# Patient Record
Sex: FEMALE | Race: BLACK OR AFRICAN AMERICAN | NOT HISPANIC OR LATINO | Employment: PART TIME | URBAN - METROPOLITAN AREA
[De-identification: names, ages, dates, MRNs, and addresses within clinical notes are randomized per-mention and may not be internally consistent; named-entity substitution may affect disease eponyms.]

---

## 2017-10-01 ENCOUNTER — PATIENT MESSAGE (OUTPATIENT)
Dept: PSYCHIATRY | Facility: CLINIC | Age: 32
End: 2017-10-01

## 2017-10-10 ENCOUNTER — OFFICE VISIT (OUTPATIENT)
Dept: PSYCHIATRY | Facility: CLINIC | Age: 32
End: 2017-10-10
Payer: MEDICAID

## 2017-10-10 DIAGNOSIS — F39 MOOD DISORDER: Primary | ICD-10-CM

## 2017-10-10 DIAGNOSIS — F60.3 BORDERLINE PERSONALITY DISORDER: ICD-10-CM

## 2017-10-10 PROCEDURE — 90834 PSYTX W PT 45 MINUTES: CPT | Mod: AJ,HB,S$PBB, | Performed by: SOCIAL WORKER

## 2017-10-10 PROCEDURE — 90834 PSYTX W PT 45 MINUTES: CPT | Mod: PBBFAC | Performed by: SOCIAL WORKER

## 2017-10-10 NOTE — PROGRESS NOTES
"Individual Psychotherapy (PhD/LCSW)    10/10/2017    Site:  [facility]         Therapeutic Intervention: Met with patient.  Outpatient - Insight oriented psychotherapy 45 min - CPT code 91357    Chief complaint/reason for encounter: mood swings, anger and personality relationship problems     Interval history and content of current session: pt 15 minutes late.   She is known to me from [id number].  She is still with boyfriend.   He is working.      That since she was treated by us she has tried to get into [facility] but she never did.  That she did find a primary care at that clinic.   She was given xanax 0.25mg which she takes as needed.      [employer] classes in [place].   recommended she comes back to therapy.  That she was told she has an identity problem.     Problem she wants to work for is her identity.  That she feels like a child in the sense of wanting to be liked by others.      She is going to [employer] twice a week.  Sunday service and a class called getting to the root of your identity.  No drugs and 1 drink at times.     Anger is still a problem.   Daughter is 11.  She is staying with pt mother because of the arguments of her with Manny.  Monday Friday is routine. But when off on Sat. No structure she cleans for hours.  Her mind is constantly going and she needs to do something. If she sits still she cant.  That she is indecisive.  For instance if she buys something by the time she gets home she may regret the decision and she goes to the store up to 4 times to return it  (say for a lipstick).       she wants to be in control.     Elementary school shy.  Grades were good in English reading science.  Math was good until later grades.   High school not good years.  Stress with house affecting school.   Mother and father fighting a  Lot.      Energy level is "I feel like I have a little bit of energy right now" meaning a bit above normal.  "I live in fear".  "Job, sexual " "trauma, trauma of parents fighting". If I feel fear I won't do it".  [place] family two hours away.  If she feels fear she will not go.   Fear getting  having another child.    He wants marriage and child.      When angry she needs to control, either in your face or isolates.  Very similar to her mother.       Pt can cry easily.  She can internalize it.  What did I do wrong.  No suicidal thoughts.  At 16 overdose but a friend made her vomit immediately after.  That suicide is not an option because of her daughter.    Sleep jumpy, feels she hears noises in house. She always sleeps at least two hours.  Nightmares about "re enacting things" of when she was younger.  Fear someone is trying to get in the house. Sometimes fear they are in the house and turns on all the lights when has to go to a room.       Food for comfort has gained weight from 167 in 2015 to current 209.  No purge.  No full bag of oreo.  Half of it.  Not go to gym. She does not feel "depressed".      Medication option.  Pt wants this option if it does not make her gain weight and if it helps her calm down or focus.   She will consider making an appointment with psychiatrist if she chooses so.   Pt is logical, focused, good vocabulary and communication skills.  Not pressured but does talks quick.  Not tangential or circumstantial.  Well dressed for work.          Treatment plan:  · Target symptoms: anxiety , mood swings, mood disorder  · Why chosen therapy is appropriate versus another modality: relevant to diagnosis  · Outcome monitoring methods: self-report, observation  · Therapeutic intervention type: insight oriented psychotherapy    Risk parameters:  Patient reports no suicidal ideation  Patient reports no homicidal ideation  Patient reports no self-injurious behavior  Patient reports no violent behavior    Verbal deficits: None    Patient's response to intervention:  The patient's response to intervention is accepting.    Progress toward " goals and other mental status changes:  The patient's progress toward goals is limited.    Diagnosis:     ICD-10-CM ICD-9-CM   1. Mood disorder F39 296.90   2. Borderline personality disorder F60.3 301.83       Plan:  individual psychotherapy and consult psychiatrist for medication evaluation  Long term psychotherapy with frequent appointments (weekly ideal).   Return to clinic: 1 week    Length of Service (minutes): 45

## 2017-10-11 ENCOUNTER — TELEPHONE (OUTPATIENT)
Dept: PSYCHIATRY | Facility: CLINIC | Age: 32
End: 2017-10-11

## 2017-11-11 ENCOUNTER — PATIENT MESSAGE (OUTPATIENT)
Dept: OBSTETRICS AND GYNECOLOGY | Facility: CLINIC | Age: 32
End: 2017-11-11

## 2017-11-13 NOTE — TELEPHONE ENCOUNTER
If upt was - then its wasn't a miscarriage.  prob just a cycle since nexplanon getting to end of it lifespan  Monitor and call for problems, if needs an appt you can schedule her

## 2017-11-13 NOTE — TELEPHONE ENCOUNTER
Hi,   Yesterday may have experienced a miscarriage. Severe pelvic pain and cramping followed by severe bleeding. Passed large clit of blood and still experiencing bleeding. Have had the flori almost 3 years with no menstraul cycle the entire time. The past few months have noticed pelvic pain. Attached is an image of what it looked like

## 2018-01-04 ENCOUNTER — PATIENT MESSAGE (OUTPATIENT)
Dept: PSYCHIATRY | Facility: CLINIC | Age: 33
End: 2018-01-04

## 2018-01-16 ENCOUNTER — PATIENT MESSAGE (OUTPATIENT)
Dept: PSYCHIATRY | Facility: CLINIC | Age: 33
End: 2018-01-16

## 2018-02-11 ENCOUNTER — OFFICE VISIT (OUTPATIENT)
Dept: URGENT CARE | Facility: CLINIC | Age: 33
End: 2018-02-11
Payer: MEDICAID

## 2018-02-11 VITALS
SYSTOLIC BLOOD PRESSURE: 126 MMHG | WEIGHT: 250 LBS | RESPIRATION RATE: 18 BRPM | TEMPERATURE: 99 F | BODY MASS INDEX: 37.89 KG/M2 | OXYGEN SATURATION: 97 % | HEIGHT: 68 IN | HEART RATE: 82 BPM | DIASTOLIC BLOOD PRESSURE: 84 MMHG

## 2018-02-11 DIAGNOSIS — R50.9 FEVER, UNSPECIFIED FEVER CAUSE: ICD-10-CM

## 2018-02-11 DIAGNOSIS — J11.1 INFLUENZA: Primary | ICD-10-CM

## 2018-02-11 LAB
CTP QC/QA: YES
FLUAV AG NPH QL: NEGATIVE
FLUBV AG NPH QL: POSITIVE

## 2018-02-11 PROCEDURE — 3008F BODY MASS INDEX DOCD: CPT | Mod: S$GLB,,, | Performed by: NURSE PRACTITIONER

## 2018-02-11 PROCEDURE — 99213 OFFICE O/P EST LOW 20 MIN: CPT | Mod: S$GLB,,, | Performed by: NURSE PRACTITIONER

## 2018-02-11 PROCEDURE — 87804 INFLUENZA ASSAY W/OPTIC: CPT | Mod: 59,QW,S$GLB, | Performed by: NURSE PRACTITIONER

## 2018-02-11 RX ORDER — PROMETHAZINE HYDROCHLORIDE AND DEXTROMETHORPHAN HYDROBROMIDE 6.25; 15 MG/5ML; MG/5ML
5 SYRUP ORAL 4 TIMES DAILY PRN
Qty: 100 ML | Refills: 0 | Status: SHIPPED | OUTPATIENT
Start: 2018-02-11 | End: 2018-02-16

## 2018-02-11 NOTE — PATIENT INSTRUCTIONS
Take over the counter Ibuprofen or Tylenol for fever/pain relief.    Please arrange follow up with your primary medical clinic as soon as possible. You must understand that you've received an Urgent Care treatment only and that you may be released before all of your medical problems are known or treated. You, the patient, will arrange for follow up as instructed. If your symptoms worsen or fail to improve you should go to the Emergency Room.      Influenza (Adult)    Influenza is also called the flu. It is a viral illness that affects the air passages of your lungs. It is different from the common cold. The flu can easily be passed from one to person to another. It may be spread through the air by coughing and sneezing. Or it can be spread by touching the sick person and then touching your own eyes, nose, or mouth.  The flu starts 1 to 3 days after you are exposed to the flu virus. It may last for 1 to 2 weeks but many people feel tired or fatigued for many weeks afterward. You usually dont need to take antibiotics unless you have a complication. This might be an ear or sinus infection or pneumonia.  Symptoms of the flu may be mild or severe. They can include extreme tiredness (wanting to stay in bed all day), chills, fevers, muscle aches, soreness with eye movement, headache, and a dry, hacking cough.  Home care  Follow these guidelines when caring for yourself at home:  · Avoid being around cigarette smoke, whether yours or other peoples.  · Acetaminophen or ibuprofen will help ease your fever, muscle aches, and headache. Dont give aspirin to anyone younger than 18 who has the flu. Aspirin can harm the liver.  · Nausea and loss of appetite are common with the flu. Eat light meals. Drink 6 to 8 glasses of liquids every day. Good choices are water, sport drinks, soft drinks without caffeine, juices, tea, and soup. Extra fluids will also help loosen secretions in your nose and lungs.  · Over-the-counter cold  medicines will not make the flu go away faster. But the medicines may help with coughing, sore throat, and congestion in your nose and sinuses. Dont use a decongestant if you have high blood pressure.  · Stay home until your fever has been gone for at least 24 hours without using medicine to reduce fever.  Follow-up care  Follow up with your healthcare provider, or as advised, if you are not getting better over the next week.  If you are age 65 or older, talk with your provider about getting a pneumococcal vaccine every 5 years. You should also get this vaccine if you have chronic asthma or COPD. All adults should get a flu vaccine every fall. Ask your provider about this.  When to seek medical advice  Call your healthcare provider right away if any of these occur:  · Cough with lots of colored mucus (sputum) or blood in your mucus  · Chest pain, shortness of breath, wheezing, or trouble breathing  · Severe headache, or face, neck, or ear pain  · New rash with fever  · Fever of 100.4°F (38°C) or higher, or as directed by your healthcare provider  · Confusion, behavior change, or seizure  · Severe weakness or dizziness  · You get a new fever or cough after getting better for a few days  Date Last Reviewed: 1/1/2017  © 1398-2855 [employer]. [address] [place] [address], [place] [address]. All rights reserved. This information is not intended as a substitute for professional medical care. Always follow your healthcare professional's instructions.

## 2018-02-11 NOTE — LETTER
February 11, 2018      [facility]  [address]  [facility] [address]  Phone: [phone]  Fax: [phone]       Patient: Denisse Pennington   YOB: 1985  Date of Visit: 02/11/2018    To Whom It May Concern:    Sena Pennington  was at [facility] on 02/11/2018. She may return to work/school on February 15, 2018 with no restrictions. If you have any questions or concerns, or if I can be of further assistance, please do not hesitate to contact me.    Sincerely,        Renee Goff MA  Clinic

## 2018-02-22 ENCOUNTER — TELEPHONE (OUTPATIENT)
Dept: OBSTETRICS AND GYNECOLOGY | Facility: CLINIC | Age: 33
End: 2018-02-22

## 2018-02-22 NOTE — TELEPHONE ENCOUNTER
----- Message from Harmony Conway sent at 2/21/2018  3:40 PM CST -----  Contact: [phone] self  Patient would like to schedule an appt to have the Jeana removed. Please call and advise.

## 2018-02-22 NOTE — TELEPHONE ENCOUNTER
Pt wanted to be scheduled to remove her nya and take a break from any kind of birth control. Pt stated that she believes that she had a miscarriage on Nov. 11, 2017. Pt scheduled 3/1/18 in the [place] location. Pt verbalized understanding.

## 2018-03-01 ENCOUNTER — OFFICE VISIT (OUTPATIENT)
Dept: OBSTETRICS AND GYNECOLOGY | Facility: CLINIC | Age: 33
End: 2018-03-01
Payer: COMMERCIAL

## 2018-03-01 VITALS
WEIGHT: 217.06 LBS | HEIGHT: 68 IN | DIASTOLIC BLOOD PRESSURE: 80 MMHG | SYSTOLIC BLOOD PRESSURE: 128 MMHG | BODY MASS INDEX: 32.9 KG/M2

## 2018-03-01 DIAGNOSIS — Z30.016 ENCOUNTER FOR INITIAL PRESCRIPTION OF TRANSDERMAL PATCH HORMONAL CONTRACEPTIVE DEVICE: Primary | ICD-10-CM

## 2018-03-01 DIAGNOSIS — Z30.432 ENCOUNTER FOR IUD REMOVAL: ICD-10-CM

## 2018-03-01 PROCEDURE — 99213 OFFICE O/P EST LOW 20 MIN: CPT | Mod: 25,S$GLB,, | Performed by: OBSTETRICS & GYNECOLOGY

## 2018-03-01 PROCEDURE — 99999 PR PBB SHADOW E&M-EST. PATIENT-LVL III: CPT | Mod: PBBFAC,,, | Performed by: OBSTETRICS & GYNECOLOGY

## 2018-03-01 PROCEDURE — 58301 REMOVE INTRAUTERINE DEVICE: CPT | Mod: S$GLB,,, | Performed by: OBSTETRICS & GYNECOLOGY

## 2018-03-01 RX ORDER — CLONAZEPAM 1 MG/1
1 TABLET ORAL 2 TIMES DAILY PRN
COMMUNITY

## 2018-03-01 RX ORDER — NORELGESTROMIN AND ETHINYL ESTRADIOL 35; 150 UG/MG; UG/MG
1 PATCH TRANSDERMAL
Qty: 4 PATCH | Refills: 11 | Status: SHIPPED | OUTPATIENT
Start: 2018-03-01 | End: 2018-09-13

## 2018-03-01 NOTE — PROGRESS NOTES
CC:needs nya out  Chief Complaint   Patient presents with    Contraception       HPI:    32 y.o.   OB History      Para Term  AB Living    1 1 1     1    SAB TAB Ectopic Multiple Live Births            1        Complaining of: wants to get on patches for contraception. Did them inpast w/o problems      (Not in a hospital admission)    Review of patient's allergies indicates:   Allergen Reactions    Bactrim [sulfamethoxazole-trimethoprim] Hives        Past Medical History:   Diagnosis Date    Anxiety     Depression      Past Surgical History:   Procedure Laterality Date     SECTION       Family History   Problem Relation Age of Onset    No Known Problems Mother     No Known Problems Father     No Known Problems Sister     No Known Problems Brother     No Known Problems Maternal Aunt     No Known Problems Paternal Aunt     No Known Problems Maternal Uncle     No Known Problems Paternal Uncle     No Known Problems Maternal Grandfather     No Known Problems Maternal Grandmother     No Known Problems Paternal Grandfather     No Known Problems Paternal Grandmother     No Known Problems Cousin     ADD / ADHD Neg Hx     Alcohol abuse Neg Hx     Anxiety disorder Neg Hx     Bipolar disorder Neg Hx     Dementia Neg Hx     Depression Neg Hx     Drug abuse Neg Hx     OCD Neg Hx     Paranoid behavior Neg Hx     Physical abuse Neg Hx     Schizophrenia Neg Hx     Seizures Neg Hx     Self injury Neg Hx     Sexual abuse Neg Hx     Suicide Neg Hx      Social History   Substance Use Topics    Smoking status: Never Smoker    Smokeless tobacco: Never Used    Alcohol use Yes      Comment: rarely     ROS:  GENERAL: Feeling well overall. Denies fever or chills.   SKIN: Denies rash or lesions.   HEAD: Denies head injury or headache.   NODES: Denies enlarged lymph nodes.   CHEST: Denies chest pain or shortness of breath.   CARDIOVASCULAR: Denies palpitations or left sided chest pain.  "   ABDOMEN: Denies diarrhea, nausea, vomiting or rectal bleeding.   URINARY: No dysuria, hematuria, or burning on urination.  REPRODUCTIVE: See HPI.   BREASTS: Denies pain, lumps, or nipple discharge.   HEMATOLOGIC: No easy bruisability or excessive bleeding.   MUSCULOSKELETAL: Denies joint pain or swelling.   NEUROLOGIC: Denies syncope or weakness.   PSYCHIATRIC: Denies depression, anxiety or mood swings.      PE: /80   Ht 5' 8" (1.727 m)   Wt 98.5 kg (217 lb 0.7 oz)   BMI 33.00 kg/m²      APPEARANCE: Well nourished, well developed, in no acute distress.  SKIN: Normal skin turgor, no lesions.  NECK: Neck symmetric without masses or thyromegaly.  NODES: No inguinal, cervical, axillary or femoral lymph node enlargement.  CARDIOVASCULAR: Normal S1, S2. No rubs, murmurs or gallops.  NEUROLOGIC: Normal mood and affect. No depression or anxiety.   ABDOMEN: Soft. No tenderness or masses. No hepatosplenomegaly. No hernias.  RESPIRATORY: Normal respiratory effort with no retractions or use of accessory muscles.  BREASTS: Symmetrical, no skin changes or visible lesions. No palpable masses, nipple discharge, or adenopathy bilaterally.   BLADDER: Non-tender  GENITALIA: normal external genitalia, no erythema, no discharge  URETHRA: normal urethra, normal urethral meatus  VAGINA: Normal  CERVIX: no lesions or cervical motion tenderness and IUD easily removed  UTERUS: normal  ADNEXA: normal adnexa    ASSESSMENT/ PLAN    Denisse was seen today for contraception.    Diagnoses and all orders for this visit:    Encounter for initial prescription of transdermal patch hormonal contraceptive device    Encounter for IUD removal            Herberth Khan MD  "

## 2018-06-28 ENCOUNTER — TELEPHONE (OUTPATIENT)
Dept: OBSTETRICS AND GYNECOLOGY | Facility: CLINIC | Age: 33
End: 2018-06-28

## 2018-06-28 NOTE — TELEPHONE ENCOUNTER
----- Message from Oralia Mendez sent at 6/28/2018 11:20 AM CDT -----  Contact: [phone]/self  Patient would like to be seen sooner than the next available appointment. Please advise.

## 2018-08-28 ENCOUNTER — TELEPHONE (OUTPATIENT)
Dept: OBSTETRICS AND GYNECOLOGY | Facility: CLINIC | Age: 33
End: 2018-08-28

## 2018-08-28 NOTE — TELEPHONE ENCOUNTER
Pt requested soonest available appt for STD check and possible UTI. Pt offered appt for today in [place]. Pt accepted and verbalized understanding.

## 2018-08-28 NOTE — TELEPHONE ENCOUNTER
----- Message from Julian Pruitt sent at 8/28/2018  9:30 AM CDT -----  Contact: [phone]/self  Pt requesting to speak with you concerning being seen ASAP for a possible UTI or she's requesting a prescription for a UTI.   Please call

## 2018-09-13 ENCOUNTER — OFFICE VISIT (OUTPATIENT)
Dept: OBSTETRICS AND GYNECOLOGY | Facility: CLINIC | Age: 33
End: 2018-09-13
Payer: MEDICAID

## 2018-09-13 VITALS
BODY MASS INDEX: 30.1 KG/M2 | WEIGHT: 198.63 LBS | DIASTOLIC BLOOD PRESSURE: 66 MMHG | SYSTOLIC BLOOD PRESSURE: 100 MMHG | HEIGHT: 68 IN

## 2018-09-13 DIAGNOSIS — B96.89 BV (BACTERIAL VAGINOSIS): ICD-10-CM

## 2018-09-13 DIAGNOSIS — Z30.018 ENCOUNTER FOR INITIAL PRESCRIPTION OF OTHER CONTRACEPTIVES: ICD-10-CM

## 2018-09-13 DIAGNOSIS — N76.0 BV (BACTERIAL VAGINOSIS): ICD-10-CM

## 2018-09-13 DIAGNOSIS — Z11.3 SCREENING FOR STD (SEXUALLY TRANSMITTED DISEASE): Primary | ICD-10-CM

## 2018-09-13 DIAGNOSIS — Z01.419 WELL WOMAN EXAM WITH ROUTINE GYNECOLOGICAL EXAM: ICD-10-CM

## 2018-09-13 DIAGNOSIS — N89.8 VAGINAL DISCHARGE: ICD-10-CM

## 2018-09-13 PROCEDURE — 99395 PREV VISIT EST AGE 18-39: CPT | Mod: S$PBB,,, | Performed by: OBSTETRICS & GYNECOLOGY

## 2018-09-13 PROCEDURE — 87480 CANDIDA DNA DIR PROBE: CPT

## 2018-09-13 PROCEDURE — 99999 PR PBB SHADOW E&M-EST. PATIENT-LVL III: CPT | Mod: PBBFAC,,, | Performed by: OBSTETRICS & GYNECOLOGY

## 2018-09-13 PROCEDURE — 88141 CYTOPATH C/V INTERPRET: CPT | Mod: ,,, | Performed by: PATHOLOGY

## 2018-09-13 PROCEDURE — 87491 CHLMYD TRACH DNA AMP PROBE: CPT

## 2018-09-13 PROCEDURE — 99213 OFFICE O/P EST LOW 20 MIN: CPT | Mod: PBBFAC,PO | Performed by: OBSTETRICS & GYNECOLOGY

## 2018-09-13 PROCEDURE — 88175 CYTOPATH C/V AUTO FLUID REDO: CPT | Performed by: PATHOLOGY

## 2018-09-13 PROCEDURE — 87510 GARDNER VAG DNA DIR PROBE: CPT

## 2018-09-13 RX ORDER — METRONIDAZOLE 500 MG/1
500 TABLET ORAL EVERY 12 HOURS
Qty: 14 TABLET | Refills: 0 | Status: SHIPPED | OUTPATIENT
Start: 2018-09-13 | End: 2018-09-20

## 2018-09-13 NOTE — PROGRESS NOTES
CC: Annual check-up    SUBJECTIVE:   33 y.o. female   for annual routine Pap and checkup. Patient's last menstrual period was 2018 (exact date)..  She complains of vaginal discharge.    Wants another nya    Past Medical History:   Diagnosis Date    Anxiety     Depression      Past Surgical History:   Procedure Laterality Date     SECTION       Social History     Socioeconomic History    Marital status: Single     Spouse name: Not on file    Number of children: 1    Years of education: Not on file    Highest education level: Not on file   Social Needs    Financial resource strain: Not on file    Food insecurity - worry: Not on file    Food insecurity - inability: Not on file    Transportation needs - medical: Not on file    Transportation needs - non-medical: Not on file   Occupational History     Employer: [employer]    Tobacco Use    Smoking status: Never Smoker    Smokeless tobacco: Never Used   Substance and Sexual Activity    Alcohol use: Yes     Comment: rarely    Drug use: No    Sexual activity: Yes     Partners: Male     Birth control/protection: Condom   Other Topics Concern    Patient feels they ought to cut down on drinking/drug use Not Asked    Patient annoyed by others criticizing their drinking/drug use Not Asked    Patient has felt bad or guilty about drinking/drug use Not Asked    Patient has had a drink/used drugs as an eye opener in the AM Not Asked   Social History Narrative    Not on file     Family History   Problem Relation Age of Onset    No Known Problems Mother     No Known Problems Father     No Known Problems Sister     No Known Problems Brother     No Known Problems Maternal Aunt     No Known Problems Paternal Aunt     No Known Problems Maternal Uncle     No Known Problems Paternal Uncle     No Known Problems Maternal Grandfather     No Known Problems Maternal Grandmother     No Known Problems Paternal Grandfather     No Known Problems  Paternal Grandmother     No Known Problems Cousin     ADD / ADHD Neg Hx     Alcohol abuse Neg Hx     Anxiety disorder Neg Hx     Bipolar disorder Neg Hx     Dementia Neg Hx     Depression Neg Hx     Drug abuse Neg Hx     OCD Neg Hx     Paranoid behavior Neg Hx     Physical abuse Neg Hx     Schizophrenia Neg Hx     Seizures Neg Hx     Self injury Neg Hx     Sexual abuse Neg Hx     Suicide Neg Hx      OB History    Para Term  AB Living   1 1 1     1   SAB TAB Ectopic Multiple Live Births           1      # Outcome Date GA Lbr Jose Maria/2nd Weight Sex Delivery Anes PTL Lv   1 Term 06   3.402 kg (7 lb 8 oz) F CS-LTranv  N CARMELITA            Current Outpatient Medications   Medication Sig Dispense Refill    clonazePAM (KLONOPIN) 1 MG tablet Take 1 mg by mouth 2 (two) times daily as needed for Anxiety.      promethazine-codeine 6.25-10 mg/5 ml (PHENERGAN WITH CODEINE) 6.25-10 mg/5 mL syrup Take 5 mLs by mouth every 6 (six) hours as needed (abdominal pain, nausea and vomiting). 180 mL 0    tramadol-acetaminophen 37.5-325 mg (ULTRACET) 37.5-325 mg Tab Take 1 tablet by mouth 2 (two) times daily as needed for Pain. 30 tablet 0    lithium (ESKALITH) 450 MG TbSR Take 1 tablet (450 mg total) by mouth once daily. 30 tablet 1    rizatriptan (MAXALT) 10 MG tablet Take 1 tablet (10 mg total) by mouth every 2 (two) hours as needed for Migraine. No more than 3 in 24 hours. 18 tablet 5     No current facility-administered medications for this visit.      Allergies: Bactrim [sulfamethoxazole-trimethoprim]     ROS:  Constitutional: no weight loss, weight gain, fever, fatigue  Eyes:  No vision changes, glasses/contacts  ENT/Mouth: No ulcers, sinus problems, ears ringing, headache  Cardiovascular: No inability to lie flat, chest pain, exercise intolerance, swelling, heart palpitations  Respiratory: No wheezing, coughing blood, shortness of breath, or cough  Gastrointestinal: No diarrhea, bloody stool,  "nausea/vomiting, constipation, gas, hemorrhoids  Genitourinary: No blood in urine, painful urination, urgency of urination, frequency of urination, incomplete emptying, incontinence, abnormal bleeding, painful periods, heavy periods, vaginal discharge, vaginal odor, painful intercourse, sexual problems, bleeding after intercourse.  Musculoskeletal: No muscle weakness  Skin/Breast: No painful breasts, nipple discharge, masses, rash, ulcers  Neurological: No passing out, seizures, numbness, headache  Endocrine: No diabetes, hypothyroid, hyperthyroid, hot flashes, hair loss, abnormal hair growth, ance  Psychiatric: No depression, crying  Hematologic: No bruises, bleeding, swollen lymph nodes, anemia.      OBJECTIVE:   The patient appears well, alert, oriented x 3, in no distress.  /66   Ht 5' 8" (1.727 m)   Wt 90.1 kg (198 lb 10.2 oz)   LMP 09/08/2018 (Exact Date)   BMI 30.20 kg/m²   NECK: no thyromegaly, trachea midline  SKIN: no acne, striae, hirsutism  BREAST EXAM: breasts appear normal, no suspicious masses, no skin or nipple changes or axillary nodes, symmetric fibrous changes in both upper outer quadrants  ABDOMEN: no hernias, masses, or hepatosplenomegaly  GENITALIA: normal external genitalia, no erythema, no discharge  URETHRA: normal urethra, normal urethral meatus  VAGINA: vaginal discharge copious, white and malodorous  CERVIX: no lesions or cervical motion tenderness  UTERUS: normal  ADNEXA: normal adnexa      ASSESSMENT:   well woman  1. Screening for STD (sexually transmitted disease)    2. Well woman exam with routine gynecological exam    3. Vaginal discharge    4. BV (bacterial vaginosis)    5. Encounter for initial prescription of other contraceptives        PLAN:   pap smear  return annually or prn  Orders Placed This Encounter    C. trachomatis/N. gonorrhoeae by AMP DNA    Vaginosis Screen by DNA Probe    Liquid-based pap smear, screening   order nya  "

## 2018-09-14 LAB
C TRACH DNA SPEC QL NAA+PROBE: DETECTED
CANDIDA RRNA VAG QL PROBE: NEGATIVE
G VAGINALIS RRNA GENITAL QL PROBE: POSITIVE
N GONORRHOEA DNA SPEC QL NAA+PROBE: NOT DETECTED
T VAGINALIS RRNA GENITAL QL PROBE: NEGATIVE

## 2018-09-17 ENCOUNTER — TELEPHONE (OUTPATIENT)
Dept: OBSTETRICS AND GYNECOLOGY | Facility: CLINIC | Age: 33
End: 2018-09-17

## 2018-09-17 RX ORDER — AZITHROMYCIN 500 MG/1
1000 TABLET, FILM COATED ORAL DAILY
Qty: 2 TABLET | Refills: 0 | Status: SHIPPED | OUTPATIENT
Start: 2018-09-17 | End: 2018-09-18

## 2018-09-17 NOTE — TELEPHONE ENCOUNTER
Pt asked if the Plan B was otc or a prescription needed to be written. Pt was told that it was a otc medication. Pt verbalized understanding.

## 2018-09-17 NOTE — TELEPHONE ENCOUNTER
----- Message from Skye Perez sent at 9/17/2018  8:52 AM CDT -----  Contact: [phone]/ SELF   Pt requesting to speak with you in regarding her prescriptions . Please advise

## 2018-09-24 ENCOUNTER — TELEPHONE (OUTPATIENT)
Dept: OBSTETRICS AND GYNECOLOGY | Facility: CLINIC | Age: 33
End: 2018-09-24

## 2018-09-24 NOTE — TELEPHONE ENCOUNTER
Spoke to a representative who requested the member's insurance number. Information was provided and verbalized understanding.

## 2018-09-24 NOTE — TELEPHONE ENCOUNTER
----- Message from Latosha Ogden sent at 9/24/2018  1:25 PM CDT -----  Contact: [facility] [phone]  Pharmacy would like to get clarity on orders for prescription ANTWAN 14 mcg/24 hour (3 years) IUD . Please call and advise.

## 2018-09-24 NOTE — TELEPHONE ENCOUNTER
----- Message from Indira Land sent at 9/21/2018  2:16 PM CDT -----  [facility] called.   No. [phone]    Pharmacy is calling regarding insurance information.   Please call.

## 2018-12-08 ENCOUNTER — HOSPITAL ENCOUNTER (EMERGENCY)
Facility: HOSPITAL | Age: 33
Discharge: HOME | End: 2018-12-08
Attending: EMERGENCY MEDICINE
Payer: MEDICAID

## 2018-12-08 VITALS
TEMPERATURE: 98 F | RESPIRATION RATE: 16 BRPM | BODY MASS INDEX: 31.39 KG/M2 | HEART RATE: 87 BPM | OXYGEN SATURATION: 98 % | SYSTOLIC BLOOD PRESSURE: 121 MMHG | DIASTOLIC BLOOD PRESSURE: 61 MMHG | HEIGHT: 67 IN | WEIGHT: 200 LBS

## 2018-12-08 DIAGNOSIS — K02.9 PAIN DUE TO DENTAL CARIES: Primary | ICD-10-CM

## 2018-12-08 DIAGNOSIS — Z34.90 PREGNANCY, UNSPECIFIED GESTATIONAL AGE: ICD-10-CM

## 2018-12-08 LAB
B-HCG UR QL: POSITIVE
CTP QC/QA: YES

## 2018-12-08 PROCEDURE — 99283 EMERGENCY DEPT VISIT LOW MDM: CPT | Mod: 25

## 2018-12-08 PROCEDURE — 99282 EMERGENCY DEPT VISIT SF MDM: CPT | Mod: ,,, | Performed by: PHYSICIAN ASSISTANT

## 2018-12-08 PROCEDURE — 25000003 PHARM REV CODE 250: Performed by: PHYSICIAN ASSISTANT

## 2018-12-08 PROCEDURE — 81025 URINE PREGNANCY TEST: CPT | Performed by: EMERGENCY MEDICINE

## 2018-12-08 RX ORDER — ACETAMINOPHEN 325 MG/1
650 TABLET ORAL
Status: COMPLETED | OUTPATIENT
Start: 2018-12-08 | End: 2018-12-08

## 2018-12-08 RX ADMIN — ACETAMINOPHEN 650 MG: 325 TABLET ORAL at 05:12

## 2018-12-08 NOTE — ED NOTES
LOC: Pt awake, alert, aware of environment, appropriate affect, oriented x3, speaking appropriately  APPEARANCE: Pt resting comfortably, no acute distress, clean, well groomed. Pt's clothing is properly fastened.  SKIN: The skin is warm and dry, intact, patient has normal skin turgor and moist mucus membranes  NEUROLOGIC: PERRL, facial expression is symmetrical, pt moving all extremities spontaneously, normal sensation in all extremities when touched with finger. Follows all commands appropriately.  MUSCULOSKELETAL: No obvious deformities.

## 2018-12-08 NOTE — DISCHARGE INSTRUCTIONS
Start taking an over the counter prenatal vitamin, as directed.    Call your OBGYN to establish prenatal care.    You can take Tylenol as directed for pain. Call to schedule a follow up appointment with a dentist.

## 2018-12-09 NOTE — ED PROVIDER NOTES
Encounter Date: 2018       History     Chief Complaint   Patient presents with    Dental Pain     r lower     This is a 33-year-old female with no known significant past medical history presents the ED with a chief complaint of dental pain.  Patient reports a 4 day history of pain to the right lower molars following recent cold weather change.  She reports increased pain with eating and drinking.  She noted mild swelling to the area today, prompting her visit.  Patient states that her LMP was last week.  She has had regular cycles prior to this.  She denies trauma, fever, chills, difficulty swallowing, neck pain/stiffness, nausea/vomiting or additional complaints.          Review of patient's allergies indicates:   Allergen Reactions    Bactrim [sulfamethoxazole-trimethoprim] Hives     Past Medical History:   Diagnosis Date    Anxiety     Depression      Past Surgical History:   Procedure Laterality Date     SECTION       Family History   Problem Relation Age of Onset    No Known Problems Mother     No Known Problems Father     No Known Problems Sister     No Known Problems Brother     No Known Problems Maternal Aunt     No Known Problems Paternal Aunt     No Known Problems Maternal Uncle     No Known Problems Paternal Uncle     No Known Problems Maternal Grandfather     No Known Problems Maternal Grandmother     No Known Problems Paternal Grandfather     No Known Problems Paternal Grandmother     No Known Problems Cousin     ADD / ADHD Neg Hx     Alcohol abuse Neg Hx     Anxiety disorder Neg Hx     Bipolar disorder Neg Hx     Dementia Neg Hx     Depression Neg Hx     Drug abuse Neg Hx     OCD Neg Hx     Paranoid behavior Neg Hx     Physical abuse Neg Hx     Schizophrenia Neg Hx     Seizures Neg Hx     Self injury Neg Hx     Sexual abuse Neg Hx     Suicide Neg Hx      Social History     Tobacco Use    Smoking status: Never Smoker    Smokeless tobacco: Never Used    Substance Use Topics    Alcohol use: Yes     Comment: rarely    Drug use: No     Review of Systems   Constitutional: Negative for chills and fever.   HENT: Positive for dental problem. Negative for sore throat.    Respiratory: Negative for shortness of breath.    Cardiovascular: Negative for chest pain.   Gastrointestinal: Negative for nausea and vomiting.   Genitourinary: Negative for dysuria.   Musculoskeletal: Negative for back pain and neck pain.   Skin: Negative for rash.   Neurological: Negative for weakness.   Hematological: Does not bruise/bleed easily.       Physical Exam     Initial Vitals [12/08/18 1716]   BP Pulse Resp Temp SpO2   121/61 87 16 98.3 °F (36.8 °C) 98 %      MAP       --         Physical Exam    Constitutional: She appears well-developed and well-nourished. No distress.   HENT:   Head: Atraumatic.   Right Ear: Tympanic membrane and ear canal normal.   Left Ear: Tympanic membrane and ear canal normal.   Mouth/Throat: Oropharynx is clear and moist and mucous membranes are normal. No trismus in the jaw. Abnormal dentition. Dental caries present. No dental abscesses or uvula swelling.       Eyes: Conjunctivae and EOM are normal. Pupils are equal, round, and reactive to light.   Neck: Normal range of motion and full passive range of motion without pain. Neck supple.   Cardiovascular: Normal rate, regular rhythm and normal heart sounds.   Pulmonary/Chest: Breath sounds normal. No respiratory distress. She has no wheezes. She has no rhonchi. She has no rales.   Abdominal: Soft. Bowel sounds are normal. There is no tenderness.   Neurological: She is alert and oriented to person, place, and time.   Skin: Skin is warm and dry. No rash noted.         ED Course   Procedures  Labs Reviewed   POCT URINE PREGNANCY - Abnormal; Notable for the following components:       Result Value    POC Preg Test, Ur Positive (*)     All other components within normal limits          Imaging Results    None                 APC / Resident Notes:   33-year-old female presenting with dental pain.  On exam she is afebrile and nontoxic.  Right lower molar with dental decay, consistent with dental caries.  I do not appreciate any surrounding signs of infection or dental abscess.    Urine pregnancy test is positive.  Advised patient of pregnancy.  Recommend Tylenol as directed for dental pain. A list of community clinics was provided to the patient, including dental resources.  She was advised to start taking an over-the-counter prenatal vitamin and call to schedule follow-up to establish prenatal care with her OBGYN, as well as her dentist.  Patient verbalized understanding and agrees with plan and course treatment.  She is stable for discharge. I discussed the care of this patient with my supervising MD.                  Clinical Impression:   The primary encounter diagnosis was Pain due to dental caries. A diagnosis of Pregnancy, unspecified gestational age was also pertinent to this visit.      Disposition:   Disposition: Discharged  Condition: Stable                        Abiola Sandhu PA-C  12/08/18 4548

## 2019-02-25 ENCOUNTER — HOSPITAL ENCOUNTER (EMERGENCY)
Facility: HOSPITAL | Age: 34
Discharge: HOME | End: 2019-02-25
Attending: EMERGENCY MEDICINE
Payer: MEDICAID

## 2019-02-25 VITALS
BODY MASS INDEX: 29.82 KG/M2 | OXYGEN SATURATION: 97 % | DIASTOLIC BLOOD PRESSURE: 68 MMHG | SYSTOLIC BLOOD PRESSURE: 124 MMHG | HEIGHT: 67 IN | WEIGHT: 190 LBS | HEART RATE: 88 BPM | TEMPERATURE: 99 F | RESPIRATION RATE: 18 BRPM

## 2019-02-25 DIAGNOSIS — K02.9 TOOTH DECAYED: ICD-10-CM

## 2019-02-25 DIAGNOSIS — K08.89 PAIN, DENTAL: ICD-10-CM

## 2019-02-25 DIAGNOSIS — J06.9 VIRAL URI: Primary | ICD-10-CM

## 2019-02-25 PROCEDURE — 99284 EMERGENCY DEPT VISIT MOD MDM: CPT | Mod: ,,, | Performed by: PHYSICIAN ASSISTANT

## 2019-02-25 PROCEDURE — 25000003 PHARM REV CODE 250: Performed by: PHYSICIAN ASSISTANT

## 2019-02-25 PROCEDURE — 99283 EMERGENCY DEPT VISIT LOW MDM: CPT

## 2019-02-25 PROCEDURE — 99284 PR EMERGENCY DEPT VISIT,LEVEL IV: ICD-10-PCS | Mod: ,,, | Performed by: PHYSICIAN ASSISTANT

## 2019-02-25 RX ORDER — AMOXICILLIN AND CLAVULANATE POTASSIUM 875; 125 MG/1; MG/1
1 TABLET, FILM COATED ORAL 2 TIMES DAILY
Qty: 14 TABLET | Refills: 0 | Status: SHIPPED | OUTPATIENT
Start: 2019-02-25 | End: 2019-03-04

## 2019-02-25 RX ORDER — ACETAMINOPHEN 500 MG
1000 TABLET ORAL
Status: COMPLETED | OUTPATIENT
Start: 2019-02-25 | End: 2019-02-25

## 2019-02-25 RX ORDER — ACETAMINOPHEN 500 MG
500 TABLET ORAL
Status: COMPLETED | OUTPATIENT
Start: 2019-02-25 | End: 2019-02-25

## 2019-02-25 RX ADMIN — ACETAMINOPHEN 500 MG: 500 TABLET ORAL at 12:02

## 2019-02-25 RX ADMIN — ACETAMINOPHEN 500 MG: 500 TABLET ORAL at 01:02

## 2019-02-25 NOTE — ED PROVIDER NOTES
Encounter Date: 2019       History     Chief Complaint   Patient presents with    Facial Pain     ? toothache or sinus infection     12:46 PM    Patient is a 33-year-old female who presents the ED with dental pain and other upper respiratory symptoms.  Patient states that she has been having a toothache for 3 days.  She has had pain in this area before due to a bad tooth. She does not currently have a dentist.  She complains of radiating pain into her right ear.  Patient also endorses subjective fevers, diaphoresis, decreased appetite, nasal and chest congestion, rhinorrhea that resolved, improved sore throat, and dry cough that started over the weekend.  She states that her daughter tested positive for the flu recently, but she states that patient had the flu before, and this does not feel as severe.  Patient took ibuprofen 600 mg earlier today.    She has no other complaints or concerns at this time.          Review of patient's allergies indicates:   Allergen Reactions    Bactrim [sulfamethoxazole-trimethoprim] Hives     Past Medical History:   Diagnosis Date    Anxiety     Depression      Past Surgical History:   Procedure Laterality Date     SECTION       Family History   Problem Relation Age of Onset    No Known Problems Mother     No Known Problems Father     No Known Problems Sister     No Known Problems Brother     No Known Problems Maternal Aunt     No Known Problems Paternal Aunt     No Known Problems Maternal Uncle     No Known Problems Paternal Uncle     No Known Problems Maternal Grandfather     No Known Problems Maternal Grandmother     No Known Problems Paternal Grandfather     No Known Problems Paternal Grandmother     No Known Problems Cousin     ADD / ADHD Neg Hx     Alcohol abuse Neg Hx     Anxiety disorder Neg Hx     Bipolar disorder Neg Hx     Dementia Neg Hx     Depression Neg Hx     Drug abuse Neg Hx     OCD Neg Hx     Paranoid behavior Neg Hx      Physical abuse Neg Hx     Schizophrenia Neg Hx     Seizures Neg Hx     Self injury Neg Hx     Sexual abuse Neg Hx     Suicide Neg Hx      Social History     Tobacco Use    Smoking status: Never Smoker    Smokeless tobacco: Never Used   Substance Use Topics    Alcohol use: Yes     Comment: rarely    Drug use: No     Review of Systems   Constitutional: Positive for diaphoresis and fever (subjective). Negative for chills.   HENT: Positive for congestion, dental problem, ear pain, rhinorrhea (resolved), sinus pressure and sore throat (improved). Negative for trouble swallowing.    Eyes: Negative for photophobia and pain.   Respiratory: Positive for cough (dry). Negative for shortness of breath.    Cardiovascular: Negative for chest pain.   Gastrointestinal: Negative for nausea and vomiting.   Genitourinary: Negative for dysuria and hematuria.   Musculoskeletal: Negative for back pain.   Skin: Negative for rash.   Neurological: Negative for weakness, numbness and headaches.   Hematological: Does not bruise/bleed easily.   Psychiatric/Behavioral: Negative for confusion.       Physical Exam     Initial Vitals [02/25/19 1203]   BP Pulse Resp Temp SpO2   124/68 88 18 98.8 °F (37.1 °C) 97 %      MAP       --         Physical Exam    Vitals reviewed.  Constitutional: She appears well-developed and well-nourished. She is not diaphoretic.   HENT:   Head: Normocephalic and atraumatic.   Right Ear: Hearing, tympanic membrane, external ear and ear canal normal. No drainage or swelling. No foreign bodies.   Left Ear: Hearing, tympanic membrane, external ear and ear canal normal. No drainage or swelling. No foreign bodies.   Nose: Nose normal.   Mouth/Throat: Mucous membranes are normal. No trismus in the jaw. Abnormal dentition. No oropharyngeal exudate, posterior oropharyngeal edema or posterior oropharyngeal erythema.       Speaking in clear and full sentences. No hot potatoe voice.   Eyes: Conjunctivae and EOM are  normal.   Neck: Normal range of motion.   Cardiovascular: Normal rate, regular rhythm and normal heart sounds. Exam reveals no friction rub.    No murmur heard.  Pulmonary/Chest: Breath sounds normal. No respiratory distress. She has no decreased breath sounds. She has no wheezes. She has no rhonchi. She has no rales.   Abdominal: Soft. Bowel sounds are normal. She exhibits no distension. There is no tenderness. There is no rebound.   Musculoskeletal: Normal range of motion.   Neurological: She is alert and oriented to person, place, and time. She has normal strength. No sensory deficit.   Skin: Skin is warm and dry. No erythema. No pallor.   Psychiatric: She has a normal mood and affect. Her behavior is normal. Judgment and thought content normal.         ED Course   Procedures  Labs Reviewed - No data to display       Imaging Results    None          Medical Decision Making:   History:   Old Medical Records: I decided to obtain old medical records.  Initial Assessment:   Patient is a 33-year-old female that presents the ED with dental pain and the other upper respiratory symptoms.  Differential Diagnosis:   Includes but is not limited to gingivitis, pulpitis, cavity, viral upper respiratory infection.  I do not suspect otitis media.  She has no signs of dental abscess or PTA.  ED Management:  Given history and physical exam, patient has a decayed wisdom tooth that is bothering her.  There is surrounding erythema, but no signs of abscess.  Her other upper respiratory symptoms are most likely viral in nature.  Given acute onset of dental pain and erythematous gingiva, will cover with Augmentin which will also give patient prophylactic coverage from bacterial sinusitis which she does not have today. Patient was given dental resources for follow-up of her dental pain. I advised OTC acetaminophen, NSAIDs, and decongestants.  Rest.  Stay hydrated.  Follow up with PCP if symptoms do not improve. All questions answered.  Patient is comfortable with plan and stable for d/c.     I have reviewed patient's chart and discussed this case with my supervising MD.     Bhavna Toscano PA-C  Emergent Department  [facility]  [facility]ink #[id number] or #[phone]                Attending Attestation:     Physician Attestation Statement for NP/PA:   I reviewed the chart but I did not personally examine the patient. The face to face encounter was performed by the NP/PA.                     Clinical Impression:       ICD-10-CM ICD-9-CM   1. Viral URI J06.9 465.9   2. Pain, dental K08.89 525.9   3. Tooth decayed K02.9 521.00         Disposition:   Disposition: Discharged  Condition: Stable                        Bhavna Toscano PA-C  02/25/19 2131       Yunier Ghosh III, MD  02/25/19 2151

## 2019-02-25 NOTE — DISCHARGE INSTRUCTIONS
Continue to take over-the-counter acetaminophen for pain relief.  You can also continue over-the-counter ibuprofen in addition as it is a different medication.      Take oral antibiotics (Augmentin) 2 times a day which is ever 12 hours for the next 7 days for your dental pain. It will also protect you from bacterial sinusitis.   Start over-the-counter decongestants such as pseudoephedrine or Sudafed.    Call and follow up with dentist.  Call and follow up with your primary care physician if her symptoms not improve or worsen return to the emergency department.    No future appointments.    Our goal in the emergency department is to always give you outstanding care and exceptional service. You may receive a survey by mail or e-mail in the next week regarding your experience in our ED. We would greatly appreciate your completing and returning the survey. Your feedback provides us with a way to recognize our staff who give very good care and it helps us learn how to improve when your experience was below our aspiration of excellence.

## 2019-02-25 NOTE — ED NOTES
Patient received with complaint of right sided tooth ache x 3 days; patient reports feeling feverish over the weekend; reports congestions, night sweats, reports a runny nose on Thursday and Friday; reports a dry cough; pt. Reports that her daughter tested positive for influenza.    No LDA's in place on arrival to department.    No family present.    Pain:  Rated 8/10.  Tooth pain    Psychosocial:  Patient is calm and cooperative.  Patients insight and judgement are appropriate to situation.  Appears clean, well maintained, with clothing appropriate to environment.  No evidence of hallucinations, delusions, or psychosis.    Neuro:  Eyes open spontaneously.  Awake, alert.  Oriented x 4.  Speech clear and appropriate.  Tolerating saliva secretions well.  Able to follow commands, demonstrating ability to actively and appropriately communicate within context of current conversation.  Symmetrical facial muscles.      Airway:  Bilateral chest rise and fall.  RR regular and non labored.  Air entry patent.    Circulatory:  Skin warm, dry, and pink.  Apical and radial pulses strong and regular. Denies chest pain.    Abdomen:  Denies abdominal pain.    Urinary:  Patient reports urination without pain.

## 2024-06-11 NOTE — PROGRESS NOTES
"Subjective:       Patient ID: Denisse Pennington is a 32 y.o. female.    Vitals:  height is 5' 8" (1.727 m) and weight is 113.4 kg (250 lb). Her oral temperature is 98.5 °F (36.9 °C). Her blood pressure is 126/84 and her pulse is 82. Her respiration is 18 and oxygen saturation is 97%.     Chief Complaint: URI    URI    This is a new problem. The current episode started 1 to 4 weeks ago. The problem has been gradually worsening. The maximum temperature recorded prior to her arrival was 101 - 101.9 F. Associated symptoms include congestion, coughing and a sore throat. Pertinent negatives include no abdominal pain, chest pain, ear pain, headaches, nausea or wheezing. She has tried NSAIDs for the symptoms. The treatment provided mild relief.     Review of Systems   Constitution: Positive for chills, fever and malaise/fatigue.   HENT: Positive for congestion and sore throat. Negative for ear pain and hoarse voice.    Eyes: Negative for discharge and redness.   Cardiovascular: Negative for chest pain, dyspnea on exertion and leg swelling.   Respiratory: Positive for cough. Negative for shortness of breath, sputum production and wheezing.    Musculoskeletal: Positive for myalgias.   Gastrointestinal: Negative for abdominal pain and nausea.   Neurological: Negative for headaches.       Objective:      Physical Exam   Constitutional: She is oriented to person, place, and time. She appears well-developed and well-nourished.   HENT:   Head: Normocephalic and atraumatic.   Right Ear: Hearing, tympanic membrane, external ear and ear canal normal. Tympanic membrane is not erythematous. No decreased hearing is noted.   Left Ear: Hearing, tympanic membrane, external ear and ear canal normal. Tympanic membrane is not erythematous. No decreased hearing is noted.   Nose: Nose normal. No mucosal edema or rhinorrhea. Right sinus exhibits no maxillary sinus tenderness and no frontal sinus tenderness. Left sinus exhibits no maxillary sinus " tenderness and no frontal sinus tenderness.   Mouth/Throat: Uvula is midline and mucous membranes are normal. No oropharyngeal exudate or posterior oropharyngeal erythema.   Eyes: Conjunctivae, EOM and lids are normal.   Neck: Normal range of motion. Neck supple.   Cardiovascular: Normal rate, regular rhythm, S1 normal, S2 normal and normal heart sounds.    Pulmonary/Chest: Effort normal and breath sounds normal. No respiratory distress.   Neurological: She is alert and oriented to person, place, and time.   Skin: Skin is warm and dry.   Nursing note and vitals reviewed.      Assessment:       1. Influenza    2. Fever, unspecified fever cause        Plan:         Influenza    Fever, unspecified fever cause  -     POCT Influenza A/B    Other orders  -     promethazine-dextromethorphan (PROMETHAZINE-DM) 6.25-15 mg/5 mL Syrp; Take 5 mLs by mouth 4 (four) times daily as needed (cough).  Dispense: 100 mL; Refill: 0           Additional Notes: Stable and continues to deny referral to ortho. Render Risk Assessment In Note?: no Detail Level: Simple